# Patient Record
Sex: MALE | Race: ASIAN | NOT HISPANIC OR LATINO | Employment: FULL TIME | URBAN - METROPOLITAN AREA
[De-identification: names, ages, dates, MRNs, and addresses within clinical notes are randomized per-mention and may not be internally consistent; named-entity substitution may affect disease eponyms.]

---

## 2024-11-23 ENCOUNTER — APPOINTMENT (OUTPATIENT)
Dept: RADIOLOGY | Facility: MEDICAL CENTER | Age: 29
End: 2024-11-23
Attending: EMERGENCY MEDICINE
Payer: COMMERCIAL

## 2024-11-23 ENCOUNTER — HOSPITAL ENCOUNTER (EMERGENCY)
Facility: MEDICAL CENTER | Age: 29
End: 2024-11-23
Attending: EMERGENCY MEDICINE
Payer: COMMERCIAL

## 2024-11-23 VITALS
BODY MASS INDEX: 20.89 KG/M2 | OXYGEN SATURATION: 97 % | HEIGHT: 66 IN | SYSTOLIC BLOOD PRESSURE: 135 MMHG | DIASTOLIC BLOOD PRESSURE: 85 MMHG | WEIGHT: 130 LBS | HEART RATE: 75 BPM | TEMPERATURE: 98 F | RESPIRATION RATE: 18 BRPM

## 2024-11-23 DIAGNOSIS — S43.005A DISLOCATED SHOULDER, LEFT, INITIAL ENCOUNTER: ICD-10-CM

## 2024-11-23 PROCEDURE — 73030 X-RAY EXAM OF SHOULDER: CPT | Mod: LT

## 2024-11-23 PROCEDURE — 23650 CLTX SHO DSLC W/MNPJ WO ANES: CPT

## 2024-11-23 PROCEDURE — 99284 EMERGENCY DEPT VISIT MOD MDM: CPT

## 2024-11-23 NOTE — ED TRIAGE NOTES
"Chief Complaint   Patient presents with    Shoulder Injury     Left  Fell while skiing today  (+) helmet, denies LOC     BP (!) 140/81   Pulse 77   Temp 36.7 °C (98 °F) (Temporal)   Resp 18   Ht 1.676 m (5' 6\")   Wt 59 kg (130 lb)   SpO2 96%   BMI 20.98 kg/m²     [place].  Pt was given 400mg Ibuprofen by ski clinic PTA.   "

## 2024-11-23 NOTE — ED PROVIDER NOTES
"ER Provider Note    Scribed for Vic Crawford D.o. by Mary Kay Butler. 11/23/2024  3:03 PM    Primary Care Provider: No primary care provider stated.    CHIEF COMPLAINT   Chief Complaint   Patient presents with    Shoulder Injury     Left  Fell while skiing today  (+) helmet, denies LOC     EXTERNAL RECORDS REVIEWED  EMS run sheet patient hurt himself skiing up at [facility] left shoulder with deformity    HPI/ROS  LIMITATION TO HISTORY   Select: : None  OUTSIDE HISTORIAN(S):  EMS at bedside who provided history as seen below.     Chana Inman is a 28 y.o. male who presents via EMS for evaluation of a left shoulder injury onset one hour ago. EMS reports that the patient was skiing when he fell on his left shoulder. Denies any loss of consciousness but patient does endorse wearing a helmet. Patient was then able to walk to the  Clinic where he received 400 mg of Ibuprofen. No imaging was done at that time. EMS reported a left shoulder deformity with limited range of motion and they suspect an anterior shoulder dislocation. Patient denies previous shoulder injury. Denies any allergies or drug use.     PAST MEDICAL HISTORY  No past medical history noted.    SURGICAL HISTORY  No past surgical history noted.    FAMILY HISTORY  No family history noted.    SOCIAL HISTORY   reports that he has never smoked. He does not have any smokeless tobacco history on file. He reports current alcohol use. He reports that he does not use drugs.    CURRENT MEDICATIONS  No current outpatient medications     ALLERGIES  Patient has no allergy information on record.    PHYSICAL EXAM  BP (!) 140/81   Pulse 77   Temp 36.7 °C (98 °F) (Temporal)   Resp 18   Ht 1.676 m (5' 6\")   Wt 59 kg (130 lb)   SpO2 96%   BMI 20.98 kg/m²   General: No acute distress  Neuro: Awake alert and oriented, muscle strength sensation normal  Neck: Supple  Cardiac: Regular rate and rhythm  Pulmonary: Clear to auscultation bilaterally no " distress  Abdomen: Soft nontender nondistended  Back: Nontender  Psych: Normal  Skin: Pink warm dry  Extremities: Full range of motion, except left arm, muscle strength intact 2+ pulses, Deformed left shoulder with no significant bony tenderness, Left arm in a sling, Circulation and motor sensation of the left arm intact.     DIAGNOSTIC STUDIES    RADIOLOGY/PROCEDURES   The attending emergency physician has independently interpreted the diagnostic imaging associated with this visit and am waiting the final reading from the radiologist.   My preliminary interpretation is a follows: Dislocated left shoulder    Radiologist interpretation:  DX-SHOULDER 2+ LEFT   Final Result      Interval reduction of previously seen left anterior shoulder dislocation.      DX-SHOULDER 2+ LEFT   Final Result      Left anterior shoulder dislocation.        REDUCTION PROCEDURE NOTE:  Patient identification was confirmed, consent was obtained verbally.  This procedure was performed at 3:32 PM by Dr. Crawford  Site: Left Shoulder  Anesthetic used (type and amt): None  Pre-procedure N/V exam intact  # of attempts: 1  Type of splint: Shoulder Immobilizer  Pt anesthetized, fx/dislocation reduced successfully. Patient tolerated procedure well without complications. Patient splinted. Post-procedure exam indicates patient is n/v intact distal to the injury site. Post-procedure films show excellent alignment. Patient  returned to baseline prior to disposition. Instructions for care discussed verbally and patient provided with additional written instructions for homecare and f/u.     COURSE & MEDICAL DECISION MAKING     ASSESSMENT, COURSE AND PLAN    DDX: Contusion vs Fracture vs Sprain vs Dislocation.     Care Narrative: Patient presents with a left shoulder pain with an obvious deformity after a fall while skiing onset one hour prior to arrival.     3:00 PM - Patient seen and examined at bedside. Discussed plan of care, including a diagnostic work  up with imaging. Patient agrees to the plan of care. Ordered for DX-Shoulder (Left) to evaluate his symptoms.      3:21 PM - Patient was reevaluated at bedside. We are still awaiting the imaging to be taken.     3:28 PM - Patient was reevaluated at bedside. Discussed radiology results with the patient and informed them that they were consistent with a shoulder dislocation. I informed patient of the plan of care to reduce the shoulder dislocation. Patient agrees with the plan of care. Joint Reduction Procedure performed by me at this time as noted above.     3:40 PM - Patient was reevaluated at bedside. I informed the plan of the plan for discharge after repeat imaging confirms the correction of the left shoulder dislocation.     4:16 PM - Patient was reevaluated at bedside. Imaging shows a successful reduction of the left shoulder. He has good circulation and motor sensation of the left upper extremity after the Joint Reduction Procedure. I also informed patient of the plan for discharge with at home symptom management as well as Orthopedic follow up. He was allowed to ask questions and agrees to the plan of care.     ADDITIONAL PROBLEM LIST  Left shoulder dislocation: Reduced, referral and follow-up with Ortho, shoulder immobilizer    DISPOSITION AND DISCUSSIONS  I have discussed management of the patient with the following physicians and FAMILIA's:  None    Discussion of management with other \[facility]\"" or appropriate source(s): None     Escalation of care considered, and ultimately not performed: Laboratory analysis.    Barriers to care at this time, including but not limited to: Patient does not have established PCP.     Decision tools and prescription drugs considered including, but not limited to:  Tylenol Motrin OTC .    The patient will return for new or worsening symptoms and is stable at the time of discharge.    The patient is referred to a primary physician for blood pressure management, diabetic screening, and for  all other preventative health concerns.    DISPOSITION:  Patient will be discharged home in stable condition.    FOLLOW UP:  James Durand M.D.  [address] [place] [id number] [phone]    Schedule an appointment as soon as possible for a visit in 2 days      [facility], Emergency Dept  [address]  [place] [id number] [phone]    As needed, If symptoms worsen    FINAL DIANGOSIS  1. Dislocated shoulder, left, initial encounter    Joint Reduction Procedure      Mary Kay GUDINO (Scribe), am scribing for, and in the presence of, Vic Crawford D.O..    Electronically signed by: Mary Kay Butler (Leonardo), 11/23/2024    Vic GUDINO D.O. personally performed the services described in this documentation, as scribed by Mary Kay Butler in my presence, and it is both accurate and complete.      The note accurately reflects work and decisions made by me.  Vic Crawford D.O.  11/23/2024  7:10 PM

## 2024-11-24 NOTE — DISCHARGE INSTRUCTIONS
We recommend wearing your shoulder immobilizer and following up with orthopedic doctor in the next couple days.  Return to the emergency department as needed.

## 2025-02-20 ENCOUNTER — HOSPITAL ENCOUNTER (EMERGENCY)
Facility: MEDICAL CENTER | Age: 30
End: 2025-02-21
Attending: EMERGENCY MEDICINE
Payer: COMMERCIAL

## 2025-02-20 ENCOUNTER — APPOINTMENT (OUTPATIENT)
Dept: RADIOLOGY | Facility: MEDICAL CENTER | Age: 30
End: 2025-02-20
Attending: EMERGENCY MEDICINE
Payer: COMMERCIAL

## 2025-02-20 DIAGNOSIS — J40 BRONCHITIS: ICD-10-CM

## 2025-02-20 LAB
FLUAV RNA SPEC QL NAA+PROBE: NEGATIVE
FLUBV RNA SPEC QL NAA+PROBE: NEGATIVE
RSV RNA SPEC QL NAA+PROBE: NEGATIVE
SARS-COV-2 RNA RESP QL NAA+PROBE: NOTDETECTED

## 2025-02-20 PROCEDURE — 99283 EMERGENCY DEPT VISIT LOW MDM: CPT

## 2025-02-20 PROCEDURE — 0241U HCHG SARS-COV-2 COVID-19 NFCT DS RESP RNA 4 TRGT ED POC: CPT

## 2025-02-21 ENCOUNTER — PHARMACY VISIT (OUTPATIENT)
Dept: PHARMACY | Facility: MEDICAL CENTER | Age: 30
End: 2025-02-21
Payer: COMMERCIAL

## 2025-02-21 ENCOUNTER — HOSPITAL ENCOUNTER (OUTPATIENT)
Dept: RADIOLOGY | Facility: MEDICAL CENTER | Age: 30
End: 2025-02-21
Attending: EMERGENCY MEDICINE
Payer: COMMERCIAL

## 2025-02-21 VITALS
OXYGEN SATURATION: 92 % | DIASTOLIC BLOOD PRESSURE: 70 MMHG | TEMPERATURE: 98 F | HEART RATE: 90 BPM | RESPIRATION RATE: 17 BRPM | HEIGHT: 65 IN | BODY MASS INDEX: 21.01 KG/M2 | WEIGHT: 126.1 LBS | SYSTOLIC BLOOD PRESSURE: 115 MMHG

## 2025-02-21 PROCEDURE — 71045 X-RAY EXAM CHEST 1 VIEW: CPT

## 2025-02-21 PROCEDURE — RXMED WILLOW AMBULATORY MEDICATION CHARGE: Performed by: EMERGENCY MEDICINE

## 2025-02-21 RX ORDER — PREDNISONE 20 MG/1
40 TABLET ORAL DAILY
Qty: 10 TABLET | Refills: 0 | Status: SHIPPED | OUTPATIENT
Start: 2025-02-21 | End: 2025-02-26

## 2025-02-21 NOTE — DISCHARGE INSTRUCTIONS
Start taking the steroids when you pick them up and finish all 5 days to try to help with the cough.  You can stop your azithromycin.  Come back to the ER if you are having more difficulty breathing or you are having increasing amounts of blood that you are coughing up

## 2025-02-21 NOTE — ED PROVIDER NOTES
"ED Provider Note    CHIEF COMPLAINT  Chief Complaint   Patient presents with    Flu Like Symptoms     Started 6 days ago.        EXTERNAL RECORDS REVIEWED  Orthopedics visit December 2024 seen for follow-up of left shoulder dislocation.    HPI/ROS  LIMITATION TO HISTORY   Select: : None  OUTSIDE HISTORIAN(S):  None    Chana Inman is a 29 y.o. male who presents to the ER for 1 week of cough that seems to be worsening and spotting hemoptysis and his phlegm that is usually yellow-brown.  No fevers.  No chest pain or difficulty breathing.  No leg swelling.  Was in [place] when URI symptoms started and then when arriving back to the  cough got a little bit worse.  Has been taking azithromycin for the past 6 days    PAST MEDICAL HISTORY   has a past medical history of Patient denies medical problems.    SURGICAL HISTORY  patient denies any surgical history    FAMILY HISTORY  History reviewed. No pertinent family history.    SOCIAL HISTORY  Social History     Tobacco Use    Smoking status: Never    Smokeless tobacco: Not on file   Vaping Use    Vaping status: Never Used   Substance and Sexual Activity    Alcohol use: Yes     Comment: occ    Drug use: Never    Sexual activity: Not on file       CURRENT MEDICATIONS  Home Medications       Reviewed by Alejandra Talamantes R.N. (Registered Nurse) on 02/20/25 at 2246  Med List Status: Partial     Medication Last Dose Status        Patient Parag Taking any Medications                           ALLERGIES  Allergies   Allergen Reactions    Goat Milk        PHYSICAL EXAM  VITAL SIGNS: /70   Pulse 90   Temp 36.7 °C (98 °F)   Resp 17   Ht 1.651 m (5' 5\")   Wt 57.2 kg (126 lb 1.7 oz)   SpO2 92%   BMI 20.98 kg/m²    General: Sitting calmly in chair, no distress  HEENT: Moist mucous membranes, normal conjunctiva  CV: Regular rate rhythm no murmurs  Pulmonary: CTAB no crackles or wheezing  MSK: No swelling in the lower extremities    EKG/LABS  Flu COVID RSV swab " negative    RADIOLOGY/PROCEDURES   I have independently interpreted the diagnostic imaging associated with this visit and am waiting the final reading from the radiologist.   My preliminary interpretation is as follows: Clear lung fields    Radiologist interpretation:  DX-CHEST-PORTABLE (1 VIEW)   Final Result         1.  No acute cardiopulmonary disease.          COURSE & MEDICAL DECISION MAKING    ASSESSMENT, COURSE AND PLAN  Care Narrative: Differential includes viral bronchitis, pneumonia, PE, reactive airway disease, lung cancer    On arrival patient is well-appearing.  Afebrile.  Breathing comfortably with clear breath sounds, sats are low 90s.  I saw a picture of the hemoptysis and it was very tiny spots in the phlegm.  No major hemoptysis.  Differential above considered.  He is already on azithromycin I suspect he may be having more viral bronchitis.  Flu swab and COVID swab negative.  Chest x-ray did not show any focal consolidations, no obvious malignancy.  Altitude adjusted PERC negative.  Given continuing cough, did discuss starting course of prednisone for bronchitis.  Patient was amenable to this so sent 5 days of prednisone to the pharmacy.  I recommended discontinuing azithromycin as he has already completed full course of it.  Discharged home in stable condition with return precautions    DISPOSITION AND DISCUSSIONS    Escalation of care considered, and ultimately not performed: None    Barriers to care at this time, including but not limited to: None.     Decision tools and prescription drugs considered including, but not limited to: Prednisone.    FINAL DIAGNOSIS  1. Bronchitis         Electronically signed by: Jesús Cantor M.D., 2/20/2025 11:36 PM

## 2025-02-21 NOTE — ED TRIAGE NOTES
"Chief Complaint   Patient presents with    Flu Like Symptoms     Started 6 days ago.       Ambulatory to triage for with above complaints, states he just recently traveled to [place]. States he is more concerned  abut his cough; he noticed blood tinged I his sputum and his left ear is \"blocked'.  Denies any CP or SOB.     AOX4 GCS15    /76   Pulse 96   Temp 36.4 °C (97.5 °F) (Temporal)   Resp 18   Ht 1.651 m (5' 5\")   Wt 57.2 kg (126 lb 1.7 oz)   SpO2 91%   BMI 20.98 kg/m²      "